# Patient Record
Sex: FEMALE | Race: BLACK OR AFRICAN AMERICAN | NOT HISPANIC OR LATINO | Employment: FULL TIME | URBAN - METROPOLITAN AREA
[De-identification: names, ages, dates, MRNs, and addresses within clinical notes are randomized per-mention and may not be internally consistent; named-entity substitution may affect disease eponyms.]

---

## 2017-12-12 ENCOUNTER — HOSPITAL ENCOUNTER (EMERGENCY)
Facility: OTHER | Age: 37
Discharge: HOME | End: 2017-12-12
Attending: EMERGENCY MEDICINE
Payer: MEDICAID

## 2017-12-12 VITALS
OXYGEN SATURATION: 100 % | WEIGHT: 275 LBS | BODY MASS INDEX: 43.16 KG/M2 | TEMPERATURE: 98 F | HEART RATE: 63 BPM | DIASTOLIC BLOOD PRESSURE: 89 MMHG | SYSTOLIC BLOOD PRESSURE: 127 MMHG | RESPIRATION RATE: 16 BRPM | HEIGHT: 67 IN

## 2017-12-12 DIAGNOSIS — D64.9 ANEMIA, UNSPECIFIED TYPE: ICD-10-CM

## 2017-12-12 DIAGNOSIS — J06.9 UPPER RESPIRATORY TRACT INFECTION, UNSPECIFIED TYPE: Primary | ICD-10-CM

## 2017-12-12 DIAGNOSIS — R42 VERTIGO: ICD-10-CM

## 2017-12-12 DIAGNOSIS — R42 DIZZINESS: ICD-10-CM

## 2017-12-12 LAB
ANION GAP SERPL CALC-SCNC: 7 MMOL/L
B-HCG UR QL: NEGATIVE
BASOPHILS # BLD AUTO: 0.03 K/UL
BASOPHILS NFR BLD: 0.5 %
BILIRUB UR QL STRIP: NEGATIVE
BUN SERPL-MCNC: 9 MG/DL
CALCIUM SERPL-MCNC: 8.7 MG/DL
CHLORIDE SERPL-SCNC: 108 MMOL/L
CLARITY UR: CLEAR
CO2 SERPL-SCNC: 25 MMOL/L
COLOR UR: YELLOW
CREAT SERPL-MCNC: 1 MG/DL
CTP QC/QA: YES
DIFFERENTIAL METHOD: ABNORMAL
EOSINOPHIL # BLD AUTO: 0 K/UL
EOSINOPHIL NFR BLD: 0.7 %
ERYTHROCYTE [DISTWIDTH] IN BLOOD BY AUTOMATED COUNT: 16.8 %
EST. GFR  (AFRICAN AMERICAN): >60 ML/MIN/1.73 M^2
EST. GFR  (NON AFRICAN AMERICAN): >60 ML/MIN/1.73 M^2
FLUAV AG SPEC QL IA: NEGATIVE
FLUBV AG SPEC QL IA: NEGATIVE
GLUCOSE SERPL-MCNC: 100 MG/DL
GLUCOSE UR QL STRIP: NEGATIVE
HCT VFR BLD AUTO: 32.5 %
HGB BLD-MCNC: 9.7 G/DL
HGB UR QL STRIP: NEGATIVE
KETONES UR QL STRIP: ABNORMAL
LEUKOCYTE ESTERASE UR QL STRIP: NEGATIVE
LYMPHOCYTES # BLD AUTO: 2.6 K/UL
LYMPHOCYTES NFR BLD: 43.9 %
MCH RBC QN AUTO: 21.2 PG
MCHC RBC AUTO-ENTMCNC: 29.8 G/DL
MCV RBC AUTO: 71 FL
MONOCYTES # BLD AUTO: 0.4 K/UL
MONOCYTES NFR BLD: 6.3 %
NEUTROPHILS # BLD AUTO: 2.9 K/UL
NEUTROPHILS NFR BLD: 48.4 %
NITRITE UR QL STRIP: NEGATIVE
PH UR STRIP: 6 [PH] (ref 5–8)
PLATELET # BLD AUTO: 350 K/UL
PMV BLD AUTO: 9.3 FL
POTASSIUM SERPL-SCNC: 4.1 MMOL/L
PROT UR QL STRIP: NEGATIVE
RBC # BLD AUTO: 4.58 M/UL
SODIUM SERPL-SCNC: 140 MMOL/L
SP GR UR STRIP: >=1.03 (ref 1–1.03)
SPECIMEN SOURCE: NORMAL
URN SPEC COLLECT METH UR: ABNORMAL
UROBILINOGEN UR STRIP-ACNC: ABNORMAL EU/DL
WBC # BLD AUTO: 5.9 K/UL

## 2017-12-12 PROCEDURE — 93005 ELECTROCARDIOGRAM TRACING: CPT

## 2017-12-12 PROCEDURE — 99284 EMERGENCY DEPT VISIT MOD MDM: CPT | Mod: 25

## 2017-12-12 PROCEDURE — 85025 COMPLETE CBC W/AUTO DIFF WBC: CPT

## 2017-12-12 PROCEDURE — 87400 INFLUENZA A/B EACH AG IA: CPT

## 2017-12-12 PROCEDURE — 93010 ELECTROCARDIOGRAM REPORT: CPT | Mod: ,,, | Performed by: INTERNAL MEDICINE

## 2017-12-12 PROCEDURE — 25000003 PHARM REV CODE 250: Performed by: PHYSICIAN ASSISTANT

## 2017-12-12 PROCEDURE — 81003 URINALYSIS AUTO W/O SCOPE: CPT

## 2017-12-12 PROCEDURE — 80048 BASIC METABOLIC PNL TOTAL CA: CPT

## 2017-12-12 PROCEDURE — 81025 URINE PREGNANCY TEST: CPT | Performed by: EMERGENCY MEDICINE

## 2017-12-12 RX ORDER — FLUTICASONE PROPIONATE 50 MCG
1 SPRAY, SUSPENSION (ML) NASAL 2 TIMES DAILY PRN
Qty: 15 G | Refills: 0 | Status: SHIPPED | OUTPATIENT
Start: 2017-12-12

## 2017-12-12 RX ORDER — FERROUS SULFATE 325(65) MG
325 TABLET ORAL DAILY
Qty: 30 TABLET | Refills: 0 | Status: SHIPPED | OUTPATIENT
Start: 2017-12-12

## 2017-12-12 RX ORDER — IBUPROFEN 600 MG/1
600 TABLET ORAL EVERY 6 HOURS PRN
Qty: 20 TABLET | Refills: 0 | Status: SHIPPED | OUTPATIENT
Start: 2017-12-12

## 2017-12-12 RX ORDER — MECLIZINE HYDROCHLORIDE 25 MG/1
25 TABLET ORAL 3 TIMES DAILY PRN
Qty: 20 TABLET | Refills: 0 | Status: SHIPPED | OUTPATIENT
Start: 2017-12-12

## 2017-12-12 RX ORDER — MECLIZINE HYDROCHLORIDE 25 MG/1
25 TABLET ORAL
Status: COMPLETED | OUTPATIENT
Start: 2017-12-12 | End: 2017-12-12

## 2017-12-12 RX ORDER — ONDANSETRON 4 MG/1
4 TABLET, ORALLY DISINTEGRATING ORAL
Status: COMPLETED | OUTPATIENT
Start: 2017-12-12 | End: 2017-12-12

## 2017-12-12 RX ORDER — SODIUM CHLORIDE 9 MG/ML
1000 INJECTION, SOLUTION INTRAVENOUS
Status: COMPLETED | OUTPATIENT
Start: 2017-12-12 | End: 2017-12-12

## 2017-12-12 RX ORDER — ONDANSETRON 4 MG/1
4 TABLET, ORALLY DISINTEGRATING ORAL EVERY 8 HOURS PRN
Qty: 12 TABLET | Refills: 0 | Status: SHIPPED | OUTPATIENT
Start: 2017-12-12

## 2017-12-12 RX ADMIN — ONDANSETRON 4 MG: 4 TABLET, ORALLY DISINTEGRATING ORAL at 06:12

## 2017-12-12 RX ADMIN — MECLIZINE HYDROCHLORIDE 25 MG: 25 TABLET ORAL at 09:12

## 2017-12-12 RX ADMIN — SODIUM CHLORIDE 1000 ML: 0.9 INJECTION, SOLUTION INTRAVENOUS at 06:12

## 2017-12-13 NOTE — ED PROVIDER NOTES
"Encounter Date: 2017       History     Chief Complaint   Patient presents with    Dizziness     dizziness when bending over, frontal headache and nausea x 4 days     Patient is 37-year-old female no significant past medical history who presents with complaints of nasal congestion, sore throat, occasional cough for the last 2 days with some nausea and dizziness that presented today.  She reports no chest pain, vomiting, fever but does endorse chills and myalgias.  Denies sick contacts or recent travel.  Has not taken any medications to help with symptoms. Does endorse similar symptoms 2 ER visit earlier this year.  Describes symptoms today as "vertigo ".  Reports feeling lightheadedness and denies seeing the room spinning.  Denies recent alcohol ingestion, no drugs, altered mental status or vision deficits.  Currently accompanied by her children who are at bedside.           Review of patient's allergies indicates:  No Known Allergies  No past medical history on file.  Past Surgical History:   Procedure Laterality Date     SECTION       No family history on file.  Social History   Substance Use Topics    Smoking status: Never Smoker    Smokeless tobacco: Never Used    Alcohol use No     Review of Systems   Constitutional: Negative for fever.   HENT: Positive for congestion. Negative for sore throat.    Respiratory: Positive for cough. Negative for shortness of breath.    Cardiovascular: Negative for chest pain.   Gastrointestinal: Negative for nausea.   Genitourinary: Negative for dysuria.   Musculoskeletal: Positive for myalgias. Negative for back pain.   Skin: Negative for rash.   Neurological: Positive for dizziness and headaches. Negative for weakness.   Hematological: Does not bruise/bleed easily.       Physical Exam     Initial Vitals [17 1817]   BP Pulse Resp Temp SpO2   130/77 85 14 98.4 °F (36.9 °C) 99 %      MAP       94.67         Physical Exam    Nursing note and vitals " reviewed.  Constitutional: She appears well-developed and well-nourished. She is not diaphoretic. No distress.   Healthy appearing female no acute distress or apparent pain.  She makes good eye contact, speaks in clear full sentences and ambulates with ease.   HENT:   Head: Normocephalic and atraumatic.   No nystagmus  Tacky mucous membranes  Posterior oropharynx has some erythema with no cobblestoning, edema or exudate  Uvula is midline and there is no trismus or lingual elevation    TMs bulging bilaterally with no erythema purulence or perforation    No anterior cervical lymphadenopathy   Eyes: Conjunctivae and EOM are normal. Pupils are equal, round, and reactive to light. Right eye exhibits no discharge. Left eye exhibits no discharge. No scleral icterus.   Neck: Normal range of motion.   Cardiovascular: Normal rate, regular rhythm, normal heart sounds and intact distal pulses. Exam reveals no gallop and no friction rub.    No murmur heard.  Pulmonary/Chest: Breath sounds normal. She has no wheezes. She has no rhonchi. She has no rales.   Abdominal: Soft. Bowel sounds are normal. There is no tenderness. There is no rebound and no guarding.   Lymphadenopathy:     She has no cervical adenopathy.   Neurological: She is alert and oriented to person, place, and time. She has normal strength. No cranial nerve deficit or sensory deficit.   No cerebellar deficits  Normal gait exam  Normal rapid alternating hand movement  Normal heel-to-shin  Normal finger to nose   Skin: Skin is warm. Capillary refill takes less than 2 seconds. No rash and no abscess noted. No erythema.   Psychiatric: She has a normal mood and affect. Her behavior is normal. Thought content normal.         ED Course   Procedures  Labs Reviewed   POCT URINE PREGNANCY     EKG Readings: (Independently Interpreted)   Initial Reading: No STEMI. Rhythm: Normal Sinus Rhythm. Heart Rate: 75. Ectopy: No Ectopy. ST Segments: Normal ST Segments. T Waves: Normal.  Axis: Normal.     Labs Reviewed   CBC W/ AUTO DIFFERENTIAL - Abnormal; Notable for the following:        Result Value    Hemoglobin 9.7 (*)     Hematocrit 32.5 (*)     MCV 71 (*)     MCH 21.2 (*)     MCHC 29.8 (*)     RDW 16.8 (*)     All other components within normal limits   BASIC METABOLIC PANEL - Abnormal; Notable for the following:     Anion Gap 7 (*)     All other components within normal limits   URINALYSIS - Abnormal; Notable for the following:     Specific Gravity, UA >=1.030 (*)     Ketones, UA Trace (*)     Urobilinogen, UA 2.0-3.0 (*)     All other components within normal limits   INFLUENZA A AND B ANTIGEN   POCT URINE PREGNANCY          Medical Decision Making:   ED Management:  Urgent evaluation of 37-year-old female who presents with complaints of dizziness and URI symptoms.  She is afebrile, nontoxic appearing, hemodynamically stable.  Physical exam outlined above and reveals no focal neuro deficits concerning for erebellar infarct.  No neuro imaging felt warranted at this time.  Labs and fluids will be given with meclizine and Zofran.  EKG pending.  We'll continue to monitor.    Update: EKG reveals normal sinus rhythm with no conduction abnormalities or dysrhythmias.  No concern for ischemic changes.  Diagnostic labs reveal nonacute anemia which certainly could be contributing to her symptoms especially in the setting of a viral illness and an element of dehydration.  Urine is concentrated she has negative flu she is treated with anti-emetics, meclizine and fluids which significantly improved her symptoms.  We'll encourage follow-up with ENT given her bulging tympanic membranes, daily Flonase use and ibuprofen for body aches and pain.  We'll give meclizine and Zofran for symptom management.  We'll encourage her to push fluids and follow primary care provider one to 2 days for evaluation for anemia.  Will initiate iron supplementation at this time.  Do not suspect her vertigo symptoms have a  central etiology or is related to cerebral infacrt - no imaging felt warranted at this time. She is educated him return precautions and verbalizes understanding.  Case discussed with attending who agrees with plan.  Other:   I have discussed this case with another health care provider.       <> Summary of the Discussion: Carol                   ED Course      Clinical Impression:   The primary encounter diagnosis was Upper respiratory tract infection, unspecified type. Diagnoses of Dizziness, Vertigo, and Anemia, unspecified type were also pertinent to this visit.                           Amina Good PA-C  12/12/17 0536

## 2017-12-13 NOTE — ED TRIAGE NOTES
Pt reports dizziness and nausea starting on Saturday. Reports headache and body aches starting yesterday. SOB starting today. Denies any sinus congestion/cough or diarrhea. Denies any change in urination.

## 2017-12-14 ENCOUNTER — HOSPITAL ENCOUNTER (EMERGENCY)
Facility: OTHER | Age: 37
Discharge: HOME | End: 2017-12-14
Attending: EMERGENCY MEDICINE
Payer: MEDICAID

## 2017-12-14 VITALS
HEART RATE: 64 BPM | OXYGEN SATURATION: 97 % | DIASTOLIC BLOOD PRESSURE: 83 MMHG | BODY MASS INDEX: 43.16 KG/M2 | HEIGHT: 67 IN | WEIGHT: 275 LBS | TEMPERATURE: 98 F | SYSTOLIC BLOOD PRESSURE: 126 MMHG | RESPIRATION RATE: 19 BRPM

## 2017-12-14 DIAGNOSIS — M20.002 FINGER DEFORMITY, LEFT: Primary | ICD-10-CM

## 2017-12-14 LAB
B-HCG UR QL: NEGATIVE
CTP QC/QA: YES

## 2017-12-14 PROCEDURE — 29131 APPL FINGER SPLINT DYNAMIC: CPT

## 2017-12-14 PROCEDURE — 81025 URINE PREGNANCY TEST: CPT | Performed by: EMERGENCY MEDICINE

## 2017-12-14 PROCEDURE — 29130 APPL FINGER SPLINT STATIC: CPT | Mod: F4

## 2017-12-14 PROCEDURE — 99284 EMERGENCY DEPT VISIT MOD MDM: CPT | Mod: 25

## 2017-12-15 NOTE — ED NOTES
Pt instructed to provide urine specimen as soon as possible, verbalized understanding and will obtain

## 2017-12-15 NOTE — ED PROVIDER NOTES
Encounter Date: 2017       History     Chief Complaint   Patient presents with    Hand Pain     unable to straighten distal interphalangeal joint 5th digit left hand; pt denies trauma     Patient is a 37-year-old female with no significant past medical history who presents to the ED with a finger deformity.  Patient states she was at work today when she noticed on her left fifth digit stuck in flexion at the DIP.  Patient denies any known injury to this finger.  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  She denies any numbness or tingling to this area.  She denies any swelling or significant pain.      The history is provided by the patient.     Review of patient's allergies indicates:  No Known Allergies  History reviewed. No pertinent past medical history.  Past Surgical History:   Procedure Laterality Date     SECTION       History reviewed. No pertinent family history.  Social History   Substance Use Topics    Smoking status: Never Smoker    Smokeless tobacco: Never Used    Alcohol use No     Review of Systems   Constitutional: Negative for chills and fever.   HENT: Negative for congestion and sore throat.    Eyes: Negative for pain.   Respiratory: Negative for shortness of breath.    Cardiovascular: Negative for chest pain.   Gastrointestinal: Negative for abdominal pain, diarrhea, nausea and vomiting.   Genitourinary: Negative for dysuria.   Musculoskeletal:        Left finger deformity    Skin: Negative for rash.   Neurological: Negative for numbness and headaches.       Physical Exam     Initial Vitals [17 1826]   BP Pulse Resp Temp SpO2   (!) 141/94 81 14 98.7 °F (37.1 °C) 100 %      MAP       109.67         Physical Exam    Constitutional: Vital signs are normal. She is cooperative.   -American female in no acute distress   HENT:   Head: Normocephalic and atraumatic.   Mouth/Throat: Oropharynx is clear and moist.   Eyes: Conjunctivae and  EOM are normal. Pupils are equal, round, and reactive to light.   Neck: Normal range of motion. Neck supple.   Cardiovascular: Normal rate, regular rhythm and intact distal pulses.   Pulmonary/Chest: Breath sounds normal. She has no wheezes. She has no rhonchi. She has no rales.   Abdominal: Soft. Bowel sounds are normal. There is no tenderness. There is no rebound and no guarding.   Musculoskeletal:   Left 5th didit is in a flexed position at the DIP.  Patient is not able to actively extend the DIP.  Passive extension of the DIP is intact.  Normal ROM at the PIP. No obvious edema   Neurological: She is alert and oriented to person, place, and time. No cranial nerve deficit. GCS eye subscore is 4. GCS verbal subscore is 5. GCS motor subscore is 6.   Normal sensation to light.   Skin: Skin is warm and dry. Capillary refill takes less than 2 seconds. No rash noted.   Psychiatric: She has a normal mood and affect. Her behavior is normal.         ED Course   Splint Application  Date/Time: 12/14/2017 9:09 PM  Performed by: RACHAEL OG  Authorized by: SIMONE LINARES   Consent Done: Yes  Consent: Verbal consent obtained.  Consent given by: patient  Patient identity confirmed: verbally with patient  Location details: left small finger  Splint type: dynamic finger  Supplies used: aluminum splint  Post-procedure: The splinted body part was neurovascularly unchanged following the procedure.  Patient tolerance: Patient tolerated the procedure well with no immediate complications        Labs Reviewed   POCT URINE PREGNANCY             Medical Decision Making:   Initial Assessment:   Urgent evaluation of a 37 y.o. female presenting to the emergency department complaining of finger deformity. Patient is afebrile, nontoxic appearing and hemodynamically stable.  ED Management:  Patient with atraumatic right 5th digit deformity.  X-ray reveals no fracture or dislocation. LUE is neurovascular intact.  No obvious edema or  erythema. Active range of motion limited, passive range of motion intact.  Physical exam is consistent with possible mallet finger tendon injury.  Patient was placed in finger splint only extending past the DIP with range of motion preserved to the PIP as described in procedure note.  Will provide referral to hand specialist. I have given specific return precautions.  Other:   I have discussed this case with another health care provider.  This note was created using Dragon Medical Dictation. There may be typographical errors secondary to dictation.                    ED Course      Clinical Impression:     1. Finger deformity, left                             Ryan Rush PA-C  12/14/17 0535

## 2018-11-20 ENCOUNTER — HOSPITAL ENCOUNTER (EMERGENCY)
Facility: OTHER | Age: 38
Discharge: HOME | End: 2018-11-20
Attending: EMERGENCY MEDICINE
Payer: MEDICAID

## 2018-11-20 VITALS
SYSTOLIC BLOOD PRESSURE: 148 MMHG | RESPIRATION RATE: 16 BRPM | DIASTOLIC BLOOD PRESSURE: 67 MMHG | OXYGEN SATURATION: 98 % | HEIGHT: 67 IN | BODY MASS INDEX: 45.99 KG/M2 | HEART RATE: 71 BPM | WEIGHT: 293 LBS | TEMPERATURE: 98 F

## 2018-11-20 DIAGNOSIS — S39.012A STRAIN OF LUMBAR REGION, INITIAL ENCOUNTER: Primary | ICD-10-CM

## 2018-11-20 LAB
B-HCG UR QL: NEGATIVE
CTP QC/QA: YES

## 2018-11-20 PROCEDURE — 63600175 PHARM REV CODE 636 W HCPCS: Performed by: EMERGENCY MEDICINE

## 2018-11-20 PROCEDURE — 99283 EMERGENCY DEPT VISIT LOW MDM: CPT

## 2018-11-20 PROCEDURE — 81025 URINE PREGNANCY TEST: CPT | Performed by: EMERGENCY MEDICINE

## 2018-11-20 RX ORDER — KETOROLAC TROMETHAMINE 30 MG/ML
10 INJECTION, SOLUTION INTRAMUSCULAR; INTRAVENOUS
Status: DISCONTINUED | OUTPATIENT
Start: 2018-11-20 | End: 2018-11-20 | Stop reason: HOSPADM

## 2018-11-20 RX ORDER — IBUPROFEN 400 MG/1
400 TABLET ORAL EVERY 6 HOURS PRN
Qty: 20 TABLET | Refills: 0 | Status: SHIPPED | OUTPATIENT
Start: 2018-11-20

## 2018-11-20 NOTE — ED PROVIDER NOTES
"Encounter Date: 2018    SCRIBE #1 NOTE: IGuera, am scribing for, and in the presence of, Dr. Hayden.       History     Chief Complaint   Patient presents with    Back Pain     lower back pain after bending over yesterday.      Time seen by provider: 4:57 AM  This is a 37 y.o. female who presents to the ED with complaint of lower back pain that happened yesterday. Patient reports she was bending over and felt like "she pulled something" when she stood up. Patient denies taking OTC medication. Patient denies falls or trauma. Patient reports history of back pain.      The history is provided by the patient.     Review of patient's allergies indicates:  No Known Allergies  History reviewed. No pertinent past medical history.  Past Surgical History:   Procedure Laterality Date     SECTION       History reviewed. No pertinent family history.  Social History     Tobacco Use    Smoking status: Never Smoker    Smokeless tobacco: Never Used   Substance Use Topics    Alcohol use: No    Drug use: No     Review of Systems   Constitutional: Negative for fever.   HENT: Negative for congestion and sore throat.    Respiratory: Negative for cough.    Cardiovascular: Negative for chest pain.   Gastrointestinal: Negative for diarrhea, nausea and vomiting.   Genitourinary: Negative for dysuria.   Musculoskeletal: Positive for back pain.   Skin: Negative for rash.   Neurological: Negative for weakness.   Hematological: Does not bruise/bleed easily.       Physical Exam     Initial Vitals [18 0443]   BP Pulse Resp Temp SpO2   (!) 148/67 71 16 98.4 °F (36.9 °C) 98 %      MAP       --         Physical Exam    Nursing note and vitals reviewed.  Constitutional: She appears well-developed and well-nourished. She is not diaphoretic. No distress.   HENT:   Head: Normocephalic and atraumatic.   Eyes: Conjunctivae and EOM are normal. No scleral icterus.   Neck: Normal range of motion. Neck supple.   Cardiovascular: " Normal rate, regular rhythm and normal heart sounds. Exam reveals no gallop and no friction rub.    No murmur heard.  Pulmonary/Chest: Breath sounds normal. No respiratory distress. She has no wheezes. She has no rhonchi. She has no rales.   Abdominal: Soft. Bowel sounds are normal. She exhibits no distension. There is no tenderness. There is no rebound and no guarding.   No CVA tenderness.   Musculoskeletal: Normal range of motion. She exhibits no edema or tenderness.   No midline spinal tenderness, step-offs, or deformities through cervical and lumbar region. No palpable spasm over bilateral lumbar region. Good flexion and extension of knees, ankles, and great toes.    Lymphadenopathy:     She has no cervical adenopathy.   Neurological: She is alert and oriented to person, place, and time.   Normal sensation of bilateral lower extremities. 5/5 strength of bilateral lower extremities.   Skin: Skin is warm and dry. No rash noted. No erythema. No pallor.   Psychiatric: She has a normal mood and affect. Her behavior is normal. Judgment and thought content normal.         ED Course   Procedures  Labs Reviewed   POCT URINE PREGNANCY          Imaging Results    None          Medical Decision Making:   ED Management:  Morbidly obese patient presents with lumbar back pain that started after she bent over to do some chores yesterday.  No trauma. No neurologic symptoms. No urinary difficulties.  Nothing to suggest spinal cord syndrome.  Patient reports she has had back pain in the past.  She has not attempted to take any analgesia either over-the-counter or prescription.  No significant tenderness on examination. Normal strength on examination. I recommend ketorolac, she refuses as she does not want intramuscular shot.  I prescribed ibuprofen encouraged her for stretching and following up with primary care.    I did have an extensive talk regarding signs to return for and need for follow up. Patient expressed understanding  and will monitor symptoms closely and follow-up as needed.    BUNNY Hayden M.D.  11/20/2018  5:33 AM              Scribe Attestation:   Scribe #1: I performed the above scribed service and the documentation accurately describes the services I performed. I attest to the accuracy of the note.    Attending Attestation:           Physician Attestation for Scribe:  Physician Attestation Statement for Scribe #1: I, Dr. Hayden, reviewed documentation, as scribed by Guera Jaime in my presence, and it is both accurate and complete.                    Clinical Impression:     1. Strain of lumbar region, initial encounter                                   Viet Hayden MD  11/20/18 0515

## 2018-11-20 NOTE — ED TRIAGE NOTES
Patient presents to ED with c/o lower back pain after bending over yesterday. She describes the pain as a constant sharp pain. Denies urinary symptoms.

## 2023-12-21 ENCOUNTER — OCCUPATIONAL HEALTH (OUTPATIENT)
Dept: URGENT CARE | Facility: CLINIC | Age: 43
End: 2023-12-21

## 2023-12-21 DIAGNOSIS — Z13.9 ENCOUNTER FOR SCREENING: Primary | ICD-10-CM

## 2023-12-21 PROCEDURE — 80305 OOH NON-DOT DRUG SCREEN: ICD-10-PCS | Mod: S$GLB,,, | Performed by: PHYSICIAN ASSISTANT

## 2023-12-21 PROCEDURE — 86580 POCT TB SKIN TEST: ICD-10-PCS | Mod: S$GLB,,, | Performed by: PHYSICIAN ASSISTANT

## 2023-12-21 PROCEDURE — 86580 TB INTRADERMAL TEST: CPT | Mod: S$GLB,,, | Performed by: PHYSICIAN ASSISTANT

## 2023-12-21 PROCEDURE — 80305 DRUG TEST PRSMV DIR OPT OBS: CPT | Mod: S$GLB,,, | Performed by: PHYSICIAN ASSISTANT

## 2023-12-23 LAB
TB INDURATION - 48 HR READ: 0 MM
TB INDURATION - 72 HR READ: 0 MM
TB SKIN TEST - 48 HR READ: NEGATIVE
TB SKIN TEST - 72 HR READ: NEGATIVE